# Patient Record
Sex: FEMALE | Race: WHITE | NOT HISPANIC OR LATINO | Employment: UNEMPLOYED | URBAN - METROPOLITAN AREA
[De-identification: names, ages, dates, MRNs, and addresses within clinical notes are randomized per-mention and may not be internally consistent; named-entity substitution may affect disease eponyms.]

---

## 2022-07-08 ENCOUNTER — HOSPITAL ENCOUNTER (EMERGENCY)
Facility: CLINIC | Age: 52
Discharge: HOME | End: 2022-07-08
Attending: EMERGENCY MEDICINE | Admitting: EMERGENCY MEDICINE
Payer: COMMERCIAL

## 2022-07-08 VITALS
HEIGHT: 65 IN | DIASTOLIC BLOOD PRESSURE: 63 MMHG | TEMPERATURE: 98.2 F | BODY MASS INDEX: 34.16 KG/M2 | OXYGEN SATURATION: 99 % | HEART RATE: 86 BPM | WEIGHT: 205 LBS | RESPIRATION RATE: 20 BRPM | SYSTOLIC BLOOD PRESSURE: 139 MMHG

## 2022-07-08 DIAGNOSIS — T78.40XA ALLERGIC REACTION, INITIAL ENCOUNTER: ICD-10-CM

## 2022-07-08 PROBLEM — I10 HYPERTENSION: Status: ACTIVE | Noted: 2022-07-08

## 2022-07-08 PROBLEM — E78.5 HYPERLIPIDEMIA: Status: ACTIVE | Noted: 2022-07-08

## 2022-07-08 PROCEDURE — 250N000012 HC RX MED GY IP 250 OP 636 PS 637: Performed by: EMERGENCY MEDICINE

## 2022-07-08 PROCEDURE — 96372 THER/PROPH/DIAG INJ SC/IM: CPT

## 2022-07-08 PROCEDURE — 99285 EMERGENCY DEPT VISIT HI MDM: CPT | Mod: 25

## 2022-07-08 PROCEDURE — 250N000009 HC RX 250: Performed by: EMERGENCY MEDICINE

## 2022-07-08 RX ORDER — PREDNISONE 20 MG/1
40 TABLET ORAL ONCE
Status: COMPLETED | OUTPATIENT
Start: 2022-07-08 | End: 2022-07-08

## 2022-07-08 RX ORDER — EPINEPHRINE 0.3 MG/.3ML
0.3 INJECTION SUBCUTANEOUS ONCE
Status: DISCONTINUED | OUTPATIENT
Start: 2022-07-08 | End: 2022-07-08 | Stop reason: RX

## 2022-07-08 RX ORDER — PREDNISONE 20 MG/1
20 TABLET ORAL DAILY
Qty: 3 TABLET | Refills: 0 | Status: SHIPPED | OUTPATIENT
Start: 2022-07-08 | End: 2022-07-11

## 2022-07-08 RX ORDER — EPINEPHRINE 0.3 MG/.3ML
0.3 INJECTION SUBCUTANEOUS
Qty: 1 EACH | Refills: 1 | Status: SHIPPED | OUTPATIENT
Start: 2022-07-08

## 2022-07-08 RX ADMIN — EPINEPHRINE 0.3 MG: 1 INJECTION INTRAMUSCULAR; INTRAVENOUS; SUBCUTANEOUS at 21:21

## 2022-07-08 RX ADMIN — PREDNISONE 40 MG: 20 TABLET ORAL at 21:12

## 2022-07-09 NOTE — ED NOTES
Pt reports she is feeling improved. She denies any CP or palpitations. She reports she no longer feels any swelling in her throat. Itching has dissipated.

## 2022-07-09 NOTE — ED NOTES
Pt reports eating nuts unknowingly in a dessert after dinner around 7pm tonight. She noticed her tongue feels swollen and she is feeling short of breath when she lays down. Some tingling feelings in her head. Pt is not SOB at this time. She reports taking 2x benadryl at home and believes it to be working. Pt anxious.

## 2022-07-09 NOTE — ED TRIAGE NOTES
Pt reports she ate a tart around 19:00 today that may have had nuts.  History of oral allergy syndrome and thinks she is having a reaction.  Pt took benadryl at 20:00.  Pt states itchy tongue, wheezing with laying down and itchy scalp.

## 2022-07-09 NOTE — ED PROVIDER NOTES
EMERGENCY DEPARTMENT ENCOUnter      NAME: Shayna Ayala  AGE: 52 year old female  YOB: 1970  MRN: [id number]  EVALUATION DATE & TIME: 2022  8:49 PM    PCP: No primary care provider on file.    ED PROVIDER: Ariel Woodruff DO      Chief Complaint   Patient presents with     Allergic Reaction         FINAL IMPRESSION:  1. Allergic reaction, initial encounter          ED COURSE & MEDICAL DECISION MAKIN:52 PM I met with the patient to gather history and to perform my initial exam. I discussed the plan for care while in the Emergency Department. PPE (N95 mask) was worn during patient encounters.     The patient presented emerged from tonight after developing an allergic reaction.  She took Benadryl at home and at the time of presentation she is complaining of some throat tightness and abdominal discomfort.  She was given an IM epi shot along with oral prednisone.  Over the course of the next few hours she had complete resolution of her symptoms.  Plan will be for discharge home with a prescription for additional prednisone along with a prescription for an EpiPen.  She has been given instructions for close outpatient primary care follow-up as well as to return for worsening symptoms.  She is comfortable with this plan.    At the conclusion of the encounter I discussed the results of all of the tests and the disposition. The questions were answered. The patient or family acknowledged understanding and was agreeable with the care plan.         MEDICATIONS GIVEN IN THE EMERGENCY:  Medications   predniSONE (DELTASONE) tablet 40 mg (40 mg Oral Given 22)   EPINEPHrine (ADRENALIN) kit 0.3 mg (0.3 mg Intramuscular Given 22)       NEW PRESCRIPTIONS STARTED AT TODAY'S ER VISIT  New Prescriptions    EPINEPHRINE (ANY BX GENERIC EQUIV) 0.3 MG/0.3ML INJECTION 2-PACK    Inject 0.3 mLs (0.3 mg) into the muscle once as needed for anaphylaxis    PREDNISONE (DELTASONE) 20 MG TABLET    Take 1  tablet (20 mg) by mouth daily for 3 days Take two tablets (= 40mg) each day for 4 (five) days          =================================================================    HPI        Shayna Ayala is a 52 year old female with a pertinent history of diabetes, hyperlipidemia, hypertension, and nut allergy who presents to this ED by private vehicle with  for evaluation of allergic reaction.    Patient reports around 1900 (~2 hours ago) she was out to dinner when she ate a tart, that she did no realize had pecans on it. Around 1730 (~1.5 hours ago) patient developed tongue and lip tingling, which is normal for her allergic reactions. She was able to get two Benadryl down around 1945. However, about 30 minutes ago she developed wheezing, tingling to the entire face, and abdominal cramping, which prompted her to present to the ED. She states she has never had an allergic reaction this bad before. She has never required an epi pen in the past for allergic reactions. Patient denies additional medical concerns or complaints at this time.       REVIEW OF SYSTEMS     Constitutional:  Denies fever or chills  HENT:  Denies sore throat. Endorses tingling to face (lips and tongue).   Respiratory:  Denies cough or shortness of breath. Endorses wheezing.  Cardiovascular:  Denies chest pain or palpitations  GI:  Denies nausea, or vomiting. Endorses abdominal pain (cramping)  Musculoskeletal:  Denies any new extremity pain   Skin:  Denies rash   Neurologic:  Denies headache, focal weakness or sensory changes    All other systems reviewed and are negative      PAST MEDICAL HISTORY:  History reviewed. No pertinent past medical history.    PAST SURGICAL HISTORY:  History reviewed. No pertinent surgical history.        CURRENT MEDICATIONS:    EPINEPHrine (ANY BX GENERIC EQUIV) 0.3 MG/0.3ML injection 2-pack  predniSONE (DELTASONE) 20 MG tablet        ALLERGIES:  Allergies   Allergen Reactions     Flavoring Agent Itching     Not  "sure of allergy.  What the content in the chocolate.     Justicia Adhatoda (Baxter Nut Tree) [Justicia Adhatoda] Itching     Peanut (Diagnostic) Other (See Comments)     tingling in mouth and lips, scalp itches       FAMILY HISTORY:  History reviewed. No pertinent family history.    SOCIAL HISTORY:   Social History     Socioeconomic History     Marital status:      Spouse name: None     Number of children: None     Years of education: None     Highest education level: None       VITALS:  Patient Vitals for the past 24 hrs:   BP Temp Temp src Pulse Resp SpO2 Height Weight   07/08/22 2237 139/63 -- -- 86 20 99 % -- --   07/08/22 2140 (!) 152/66 -- -- 85 18 98 % -- --   07/08/22 2116 (!) 166/69 -- -- 88 20 98 % -- --   07/08/22 2042 (!) 166/100 98.2  F (36.8  C) Oral 100 20 98 % 1.651 m (5' 5\") 93 kg (205 lb)       PHYSICAL EXAM    Constitutional:  Well developed, Well nourished,  HENT:  Normocephalic, Atraumatic, Bilateral external ears normal, Oropharynx moist, Nose normal.   Neck:  Normal range of motion, No meningismus, No stridor.   Eyes:  EOMI, Conjunctiva normal, No discharge.   Respiratory:  Normal breath sounds, No respiratory distress, No wheezing, No chest tenderness.   Cardiovascular:  Normal heart rate, Normal rhythm, No murmurs  GI:  Soft, No tenderness, No guarding, No CVA tenderness.   Musculoskeletal:   No tenderness to palpation or major deformities noted.   Integument:  Warm, Dry, No erythema, No rash.   Neurologic:  Alert & oriented x 3, Normal motor function, Normal sensory function, No focal deficits noted.   Psychiatric:  Affect normal, Judgment normal, Mood normal.            I, Jeannine Simpson, am serving as a scribe to document services personally performed by Dr. Woodruff based on my observation and the provider's statements to me. I, Ariel Woodruff, DO attest that Jeannine Simpson is acting in a scribe capacity, has observed my performance of the services and has documented them in " accordance with my direction.    Ariel Woodruff DO  Emergency Medicine  [facility] EMERGENCY ROOM  [address] [address] [phone]  Dept: [phone]     Ariel Woodruff MD  07/08/22 1597

## 2022-07-09 NOTE — DISCHARGE INSTRUCTIONS
You suffered an allergic reaction tonight.  Take the prescribed medication as directed.  Follow-up with your primary care doctor as an outpatient and return to the ER right away if you develop any worsening symptoms or if you have any other concerns.